# Patient Record
Sex: MALE | Race: WHITE
[De-identification: names, ages, dates, MRNs, and addresses within clinical notes are randomized per-mention and may not be internally consistent; named-entity substitution may affect disease eponyms.]

---

## 2023-08-03 ENCOUNTER — HOSPITAL ENCOUNTER (EMERGENCY)
Dept: HOSPITAL 26 - MED | Age: 68
Discharge: HOME | End: 2023-08-03
Payer: MEDICAID

## 2023-08-03 VITALS
SYSTOLIC BLOOD PRESSURE: 165 MMHG | OXYGEN SATURATION: 96 % | DIASTOLIC BLOOD PRESSURE: 75 MMHG | HEART RATE: 56 BPM | TEMPERATURE: 98.3 F

## 2023-08-03 VITALS — BODY MASS INDEX: 36.05 KG/M2 | HEIGHT: 65 IN | WEIGHT: 216.38 LBS

## 2023-08-03 VITALS
DIASTOLIC BLOOD PRESSURE: 68 MMHG | OXYGEN SATURATION: 98 % | SYSTOLIC BLOOD PRESSURE: 111 MMHG | RESPIRATION RATE: 17 BRPM | HEART RATE: 74 BPM

## 2023-08-03 DIAGNOSIS — Z79.899: ICD-10-CM

## 2023-08-03 DIAGNOSIS — R03.0: ICD-10-CM

## 2023-08-03 DIAGNOSIS — M51.36: Primary | ICD-10-CM

## 2023-08-03 NOTE — NUR
Pt bib wife for back pain. Pt states he tripped forward onto his hands at 
[employer]. Pain is 10/10, constant, non radiating, ache. No abnormalities noted at 
back. Pt does not see an md regularly. Pt is a/o x 4, vss, no ss of acute 
distress, breathing equal and unlabored, speech clear, wife at bedside.

## 2023-08-03 NOTE — NUR
Spoke to [facility] TERRY Pan and stated that pain reassessed at 1355 
with pain decreased to 0/10.

## 2023-10-16 ENCOUNTER — HOSPITAL ENCOUNTER (EMERGENCY)
Dept: HOSPITAL 26 - MED | Age: 68
Discharge: HOME | End: 2023-10-16
Payer: MEDICAID

## 2023-10-16 VITALS
RESPIRATION RATE: 16 BRPM | TEMPERATURE: 97.6 F | SYSTOLIC BLOOD PRESSURE: 147 MMHG | DIASTOLIC BLOOD PRESSURE: 84 MMHG | HEART RATE: 50 BPM

## 2023-10-16 VITALS — WEIGHT: 208 LBS | BODY MASS INDEX: 34.66 KG/M2 | HEIGHT: 65 IN

## 2023-10-16 VITALS
RESPIRATION RATE: 16 BRPM | HEART RATE: 50 BPM | OXYGEN SATURATION: 96 % | SYSTOLIC BLOOD PRESSURE: 144 MMHG | DIASTOLIC BLOOD PRESSURE: 84 MMHG | TEMPERATURE: 97.6 F

## 2023-10-16 VITALS — OXYGEN SATURATION: 96 %
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
## 2023-10-16 DIAGNOSIS — Z79.899: ICD-10-CM
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
## 2023-10-16 DIAGNOSIS — S00.83XA: Primary | ICD-10-CM

## 2023-10-16 DIAGNOSIS — S00.31XA: ICD-10-CM

## 2023-10-16 DIAGNOSIS — H25.89: ICD-10-CM
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
## 2023-10-16 DIAGNOSIS — S20.212A: ICD-10-CM
